# Patient Record
Sex: FEMALE | Race: WHITE
[De-identification: names, ages, dates, MRNs, and addresses within clinical notes are randomized per-mention and may not be internally consistent; named-entity substitution may affect disease eponyms.]

---

## 2018-02-22 ENCOUNTER — HOSPITAL ENCOUNTER (OUTPATIENT)
Dept: HOSPITAL 63 - DXRAD | Age: 48
Discharge: HOME | End: 2018-02-22
Attending: GENERAL PRACTICE
Payer: COMMERCIAL

## 2018-02-22 DIAGNOSIS — M25.562: Primary | ICD-10-CM

## 2018-02-22 PROCEDURE — 73562 X-RAY EXAM OF KNEE 3: CPT

## 2018-02-22 NOTE — RAD
3 view left knee study



Indications: Left knee pain for months.



Findings: No acute fracture or dislocation or osteolytic process is seen.  No

significant arthritic change is seen.



IMPRESSION: No significant osseous abnormality.

## 2020-07-10 ENCOUNTER — HOSPITAL ENCOUNTER (EMERGENCY)
Dept: HOSPITAL 63 - ER | Age: 50
Discharge: LEFT BEFORE DISCHARGE | End: 2020-07-10
Payer: SELF-PAY

## 2020-07-10 VITALS
DIASTOLIC BLOOD PRESSURE: 11 MMHG | SYSTOLIC BLOOD PRESSURE: 4 MMHG | DIASTOLIC BLOOD PRESSURE: 11 MMHG | SYSTOLIC BLOOD PRESSURE: 4 MMHG

## 2020-07-10 VITALS — BODY MASS INDEX: 35.96 KG/M2 | WEIGHT: 178.35 LBS | HEIGHT: 59 IN
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
## 2020-07-10 DIAGNOSIS — S60.454A: Primary | ICD-10-CM
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
## 2020-07-10 DIAGNOSIS — Z53.21: ICD-10-CM
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
## 2020-09-18 ENCOUNTER — HOSPITAL ENCOUNTER (OUTPATIENT)
Dept: HOSPITAL 63 - LAB | Age: 50
End: 2020-09-18
Attending: NURSE ANESTHETIST, CERTIFIED REGISTERED
Payer: COMMERCIAL

## 2020-09-18 DIAGNOSIS — Z20.828: ICD-10-CM

## 2020-09-18 DIAGNOSIS — Z01.812: Primary | ICD-10-CM

## 2020-09-22 ENCOUNTER — HOSPITAL ENCOUNTER (OUTPATIENT)
Dept: HOSPITAL 63 - SURG | Age: 50
Discharge: HOME | End: 2020-09-22
Attending: INTERNAL MEDICINE
Payer: COMMERCIAL

## 2020-09-22 VITALS — SYSTOLIC BLOOD PRESSURE: 110 MMHG | DIASTOLIC BLOOD PRESSURE: 70 MMHG

## 2020-09-22 DIAGNOSIS — Z79.899: ICD-10-CM

## 2020-09-22 DIAGNOSIS — Z98.890: ICD-10-CM

## 2020-09-22 DIAGNOSIS — Z87.891: ICD-10-CM

## 2020-09-22 DIAGNOSIS — K52.89: ICD-10-CM

## 2020-09-22 DIAGNOSIS — G47.33: ICD-10-CM

## 2020-09-22 DIAGNOSIS — I10: ICD-10-CM

## 2020-09-22 DIAGNOSIS — K63.5: ICD-10-CM

## 2020-09-22 DIAGNOSIS — Z90.710: ICD-10-CM

## 2020-09-22 DIAGNOSIS — Z12.11: Primary | ICD-10-CM

## 2020-09-22 DIAGNOSIS — Z90.49: ICD-10-CM

## 2020-09-22 PROCEDURE — 88305 TISSUE EXAM BY PATHOLOGIST: CPT

## 2020-09-22 PROCEDURE — 45380 COLONOSCOPY AND BIOPSY: CPT

## 2020-09-23 NOTE — PATHOLOGY
[facility] Accession Number: [id number]

.                                                                01

Material submitted:                                        .

sigmoid colon - SIGMOID POLYP

.                                                                01

Clinical history:                                          .

COLONOSCOPY

.                                                                02

**********************************************************************

Diagnosis:

Colon biopsy, sigmoid polyp:

- Hyperplastic polyp, with mild acute and chronic inflammation.

([facility]:pit 09/23/2020)

[facility]  09/23/2020  1336 Local

**********************************************************************

.                                                                02

Comment:

There are no adenomatous changes or evidence of malignancy.

([facility]:pit 09/23/2020)

.                                                                02

Electronically signed:                                     .

Wyatt Churchill MD, Pathologist

NPI- [id number]

.                                                                01

Gross description:                                         .

The specimen is received in formalin, labeled "Darner, Thalia, sigmoid

polyp" and consists of a fragment of pink-tan tissue measuring 0.6 x 0.4 x

0.1 cm which is entirely submitted in A1.

([facility]; 9/22/2020)

[facility]  09/22/2020  1738 Local

.                                                                02

Pathologist provided ICD-10:

K63.5, K52.9

.                                                                02

CPT                                                        .

[id number]

Specimen Comment: A courtesy copy of this report has been sent to [phone]

Specimen Comment: [id number]

Specimen Comment: Report sent to  / DR SEGURA

***Performed at:  01

   [facility]

   [address], [place]  [id number]

   MD Pablo Suarez MD Phone:  [phone]

***Performed at:  02

   [facility]

   [address], [place]  [id number]

   MD Wyatt Churchill MD Phone:  [phone]